# Patient Record
Sex: MALE | HISPANIC OR LATINO | Employment: FULL TIME | URBAN - NONMETROPOLITAN AREA
[De-identification: names, ages, dates, MRNs, and addresses within clinical notes are randomized per-mention and may not be internally consistent; named-entity substitution may affect disease eponyms.]

---

## 2022-03-21 ENCOUNTER — OFFICE VISIT (OUTPATIENT)
Dept: UROLOGY | Facility: CLINIC | Age: 37
End: 2022-03-21

## 2022-03-21 VITALS — WEIGHT: 230 LBS | HEIGHT: 70 IN | BODY MASS INDEX: 32.93 KG/M2

## 2022-03-21 DIAGNOSIS — B37.9 CANDIDIASIS: ICD-10-CM

## 2022-03-21 DIAGNOSIS — E29.1 HYPOGONADISM IN MALE: Primary | ICD-10-CM

## 2022-03-21 PROCEDURE — 99204 OFFICE O/P NEW MOD 45 MIN: CPT | Performed by: UROLOGY

## 2022-03-21 RX ORDER — NYSTATIN 100000 [USP'U]/G
POWDER TOPICAL
Qty: 30 G | Refills: 4 | Status: SHIPPED | OUTPATIENT
Start: 2022-03-21

## 2022-03-21 NOTE — PROGRESS NOTES
"Chief Complaint  Erectile dysfunction    Referring Provider  Jose Babin MD    HPI  Mr. Inocente Rene is a 36 y.o. male with  who presents with Low T and premature ejaculation     He primarily has difficulty with maintaining an erection.  The majority of the conversation was held in French, and he had difficulty explaining to me whether or not the chief issue was erectile dysfunction versus premature ejaculation, but he admitted to both.  He does complain of low energy and low libido, which prompted testosterone testing from his primary care physician.    He has tried no medication in the past.    Without medication, he rates his erectile rigidity as a 8-9 on a scale of 0-10 (with 6 being just firm enough for penetration).     He does not take the oral PDE5- on an empty stomach, 1 hour prior to intercourse and without alcohol. He does not take oral nitrates for chest pain.      Past Medical History  History reviewed. No pertinent past medical history.    Past Surgical History  History reviewed. No pertinent surgical history.    Medications  He currently has no medications in their medication list.    Allergies  No Known Allergies    Social History  He  reports that he has never smoked. He has never used smokeless tobacco. He reports previous alcohol use. He reports that he does not use drugs.    Family History  He has no family history of prostate, bladder or kidney cancer.  There is not a strong family history of cardiovascular disease.  He family history includes Cancer in his father; Hypertension in his mother.      Physical Exam  Ht 177.8 cm (70\")   Wt 104 kg (230 lb)   BMI 33.00 kg/m²   Body mass index is 33 kg/m².  Constitutional: NAD, WDWN.   HEENT: NCAT. Conjunctivae normal.  MMM.    Cardiovascular: Regular rate.  Pulmonary/Chest: Respirations are even and non-labored bilaterally.  Abdominal: Soft. No distension, tenderness, masses or guarding. No CVA tenderness.  Neurological: A + O x 3.  " Cranial Nerves II-XII grossly intact. Normal gait.  Extremities: CARA x 4, Warm. No clubbing.  No cyanosis.    Skin: Pink, warm and dry.  No rashes noted.    Genitourinary  Penis: uncircumcised penis, glans normal, no penile discharge.  No rashes/lesions.    Testes: Left: 25mL, Right: 25mL, no masses and normal consistency, nontender to palpation. Vasa palpable bilaterally.  No clinically palpable varicocele with valsalva. Remainder of scrotal contents normal    Labs  No results found for: TESTOSTERONE, PROLACTIN, FSH, LH, HCT     I reviewed his labs from [facility] with his total testosterone around 220.    No results found for: PSA    Assessment  Mr. Inocente Rene is a 36 y.o. male with hypogonadism and premature ejaculation, erectile dysfunction.  This is a new problem of uncertain clinical prognosis.    Plan  1.   Start clomid 3x per week